# Patient Record
Sex: MALE | Race: BLACK OR AFRICAN AMERICAN
[De-identification: names, ages, dates, MRNs, and addresses within clinical notes are randomized per-mention and may not be internally consistent; named-entity substitution may affect disease eponyms.]

---

## 2022-01-18 PROBLEM — Z00.00 ENCOUNTER FOR PREVENTIVE HEALTH EXAMINATION: Status: ACTIVE | Noted: 2022-01-18

## 2022-01-26 ENCOUNTER — APPOINTMENT (OUTPATIENT)
Dept: PEDIATRIC ALLERGY IMMUNOLOGY | Facility: CLINIC | Age: 32
End: 2022-01-26
Payer: MEDICAID

## 2022-01-26 VITALS
SYSTOLIC BLOOD PRESSURE: 162 MMHG | DIASTOLIC BLOOD PRESSURE: 100 MMHG | BODY MASS INDEX: 28.73 KG/M2 | WEIGHT: 189.56 LBS | HEIGHT: 68 IN | TEMPERATURE: 97.5 F

## 2022-01-26 PROCEDURE — 99203 OFFICE O/P NEW LOW 30 MIN: CPT

## 2022-01-26 NOTE — REASON FOR VISIT
[Initial Evaluation] : an initial evaluation of [Allergy Evaluation/ Skin Testing] : allergy evaluation and or skin testing [Family Member] : family member

## 2022-01-26 NOTE — SOCIAL HISTORY
[House] : [unfilled] lives in a house  [Central Forced Air] : heating provided by central forced air [Window Units] : air conditioning provided by window units [None] : none [Single] : single [Humidifier] : does not use a humidifier [Dehumidifier] : does not use a dehumidifier [Cockroaches] : Patient states that there are no cockroaches in the home [Dust Mite Covers] : does not have dust mite covers [Feather Pillows] : does not have feather pillows [Feather Comforter] : does not have a feather comforter [Bedroom] : not in the bedroom [Basement] : not in the basement [Living Area] : not in the living area [Smokers in Household] : there are no smokers in the home [de-identified] : on stairs

## 2022-01-26 NOTE — HISTORY OF PRESENT ILLNESS
[de-identified] : MEGGAN MARTINS is a 31 year male  with a history of sneezing, runny nose, nasal congestion,  coughing watery and itchy eyes that has been going on for over 10 years. Pt states he recently moved to [place] about 2  months ago, he has not noticed an increased of symptoms. He had an Allergy skin test in his country 2 years ago with only positives to dust mites. Pt states he used to take Zyrtec but through out the years it stop working, he also uses a nasal sprays with some relief. Pt has no other medical conditions and no food allergies. Pt is here to see if he has any new environmental allergies and would like to get proper treatment.

## 2022-01-26 NOTE — ASSESSMENT
[FreeTextEntry1] : 1. AR/AC - Fluticasone nasal, Cetirizine 10g, Azelastine nasal, Montelukast 10mg, Ige and Immunocap.\par \par 2. High blood pressure - advised to re eval with PCP

## 2022-01-26 NOTE — REVIEW OF SYSTEMS
[Eye Discharge] : eye discharge [Eye Redness] : redness [Eye Itching] : itchy eyes [Rhinorrhea] : rhinorrhea [Nasal Congestion] : nasal congestion [Sneezing] : sneezing [Cough] : cough [Nl] : Genitourinary [FreeTextEntry4] : sinus pressure headaches

## 2022-01-27 ENCOUNTER — LABORATORY RESULT (OUTPATIENT)
Age: 32
End: 2022-01-27

## 2022-02-04 LAB
A ALTERNATA IGE QN: <0.1 KUA/L
A FUMIGATUS IGE QN: <0.1 KUA/L
BOXELDER IGE QN: <0.1 KUA/L
C ALBICANS IGE QN: <0.1 KUA/L
CAT DANDER IGE QN: <0.1 KUA/L
CMN PIGWEED IGE QN: <0.1 KUA/L
COMMON RAGWEED IGE QN: <0.1 KUA/L
D FARINAE IGE QN: >100 KUA/L
D PTERONYSS IGE QN: >100 KUA/L
DEPRECATED A ALTERNATA IGE RAST QL: 0
DEPRECATED A FUMIGATUS IGE RAST QL: 0
DEPRECATED BOXELDER IGE RAST QL: 0
DEPRECATED C ALBICANS IGE RAST QL: 0
DEPRECATED CAT DANDER IGE RAST QL: 0
DEPRECATED COMMON PIGWEED IGE RAST QL: 0
DEPRECATED COMMON RAGWEED IGE RAST QL: 0
DEPRECATED D FARINAE IGE RAST QL: 6
DEPRECATED D PTERONYSS IGE RAST QL: 6
DEPRECATED DOG DANDER IGE RAST QL: 0
DEPRECATED ENGL PLANTAIN IGE RAST QL: 0
DEPRECATED KENT BLUE GRASS IGE RAST QL: 0
DEPRECATED RYE IGE RAST QL: 0
DEPRECATED SILVER BIRCH IGE RAST QL: 0
DEPRECATED TIMOTHY IGE RAST QL: 0
DEPRECATED WHITE OAK IGE RAST QL: 0
DOG DANDER IGE QN: <0.1 KUA/L
ENGL PLANTAIN IGE QN: <0.1 KUA/L
KENT BLUE GRASS IGE QN: <0.1 KUA/L
RYE IGE QN: <0.1 KUA/L
SILVER BIRCH IGE QN: <0.1 KUA/L
TIMOTHY IGE QN: <0.1 KUA/L
TOTAL IGE SMQN RAST: 574 KU/L
WHITE OAK IGE QN: <0.1 KUA/L

## 2022-02-09 ENCOUNTER — TRANSCRIPTION ENCOUNTER (OUTPATIENT)
Age: 32
End: 2022-02-09

## 2022-02-09 ENCOUNTER — APPOINTMENT (OUTPATIENT)
Dept: PEDIATRIC ALLERGY IMMUNOLOGY | Facility: CLINIC | Age: 32
End: 2022-02-09
Payer: MEDICAID

## 2022-02-09 VITALS
SYSTOLIC BLOOD PRESSURE: 160 MMHG | HEIGHT: 68 IN | BODY MASS INDEX: 27.74 KG/M2 | DIASTOLIC BLOOD PRESSURE: 100 MMHG | TEMPERATURE: 97.5 F | WEIGHT: 183 LBS

## 2022-02-09 PROCEDURE — 99213 OFFICE O/P EST LOW 20 MIN: CPT

## 2022-02-09 NOTE — HISTORY OF PRESENT ILLNESS
[None] : The patient is currently asymptomatic [de-identified] : MEGGAN MARTINS is a 31 year male with a history of sneezing, runny nose, nasal congestion, coughing watery and itchy eyes that has been going on for over 10 years. Pt states he recently moved to [place] about 2 months ago, he has not noticed an increased of symptoms. He had an Allergy skin test in his country 2 years ago with only positives to dust mites. Pt states he used to take Zyrtec but through out the years it stop working, he also uses a nasal sprays with some relief. Pt has no other medical conditions and no food allergies. \par \par He improved on medicines.  [de-identified] : labs follow up

## 2022-02-09 NOTE — SOCIAL HISTORY
[Mother] : mother [Father] : father [Sister] : sister [House] : [unfilled] lives in a house  [Central Forced Air] : heating provided by central forced air [Window Units] : air conditioning provided by window units [None] : none [Single] : single [Humidifier] : does not use a humidifier [Dehumidifier] : does not use a dehumidifier [Dust Mite Covers] : does not have dust mite covers [Feather Pillows] : does not have feather pillows [Feather Comforter] : does not have a feather comforter [Bedroom] : not in the bedroom [Basement] : not in the basement [Living Area] : not in the living area [Smokers in Household] : there are no smokers in the home

## 2022-05-11 ENCOUNTER — APPOINTMENT (OUTPATIENT)
Dept: PEDIATRIC ALLERGY IMMUNOLOGY | Facility: CLINIC | Age: 32
End: 2022-05-11
Payer: MEDICAID

## 2022-05-11 ENCOUNTER — RX RENEWAL (OUTPATIENT)
Age: 32
End: 2022-05-11

## 2022-05-11 VITALS
BODY MASS INDEX: 27.74 KG/M2 | SYSTOLIC BLOOD PRESSURE: 130 MMHG | HEIGHT: 68 IN | DIASTOLIC BLOOD PRESSURE: 82 MMHG | WEIGHT: 183 LBS

## 2022-05-11 PROCEDURE — 99213 OFFICE O/P EST LOW 20 MIN: CPT

## 2022-05-11 NOTE — HISTORY OF PRESENT ILLNESS
[None] : The patient is currently asymptomatic [de-identified] : MEGGAN MARTINS is a 32 year male  with Allergic Rhinitis. Patient is here for a follow up. He states he has been doing well, his allergy symptoms have been under good control with Cetirizine 10 mg, Montelukast 10 mg, Fluticasone nasal spray and Azelastine nasal spray.

## 2022-05-11 NOTE — ASSESSMENT
[FreeTextEntry1] : 1. AR/AC - Fluticasone nasal, Cetirizine 10mg, Azelastine nasal, Montelukast 10mg\par \par 2. High blood pressure - advised to re eval with PCP

## 2022-11-16 ENCOUNTER — APPOINTMENT (OUTPATIENT)
Dept: PEDIATRIC ALLERGY IMMUNOLOGY | Facility: CLINIC | Age: 32
End: 2022-11-16

## 2022-11-16 VITALS
SYSTOLIC BLOOD PRESSURE: 120 MMHG | HEIGHT: 68 IN | BODY MASS INDEX: 26.98 KG/M2 | TEMPERATURE: 98.1 F | WEIGHT: 178 LBS | DIASTOLIC BLOOD PRESSURE: 80 MMHG

## 2022-11-16 PROCEDURE — 99213 OFFICE O/P EST LOW 20 MIN: CPT

## 2022-11-16 NOTE — PHYSICAL EXAM
[Alert] : alert [Well Nourished] : well nourished [Healthy Appearance] : healthy appearance [No Acute Distress] : no acute distress [Well Developed] : well developed [Normal Pupil & Iris Size/Symmetry] : normal pupil and iris size and symmetry [No Discharge] : no discharge [No Photophobia] : no photophobia [Sclera Not Icteric] : sclera not icteric [Normal TMs] : both tympanic membranes were normal [Normal Nasal Mucosa] : the nasal mucosa was normal [Normal Lips/Tongue] : the lips and tongue were normal [Normal Outer Ear/Nose] : the ears and nose were normal in appearance [Supple] : the neck was supple [Normal Rate and Effort] : normal respiratory rhythm and effort [No Crackles] : no crackles [No Retractions] : no retractions [Bilateral Audible Breath Sounds] : bilateral audible breath sounds [Normal Rate] : heart rate was normal  [Normal S1, S2] : normal S1 and S2 [No murmur] : no murmur [Regular Rhythm] : with a regular rhythm [Skin Intact] : skin intact  [No Rash] : no rash [No Skin Lesions] : no skin lesions [Normal Mood] : mood was normal [Normal Affect] : affect was normal [Alert, Awake, Oriented as Age-Appropriate] : alert, awake, oriented as age appropriate

## 2022-11-16 NOTE — HISTORY OF PRESENT ILLNESS
[None] : The patient is currently asymptomatic [de-identified] : MEGGAN MARTINS is a 32 year male with Allergic Rhinitis. Patient is here for a follow up. He states he has been doing well, his allergy symptoms have been under good control with Cetirizine 10 mg, Montelukast 10 mg, Fluticasone nasal spray and Azelastine nasal spray. \par \peter Colvin is here today for a follow up he said when fall started he started to sneeze more often, headache, congestion, and he still feels something in his throat. so for him it seems to be getting bad for him in the fall. he also sates he needs refill on medications he says the medications help. he is going away to travel to his country December 30 and returning January 29 so he need enough medications until he gets back.

## 2022-11-16 NOTE — REVIEW OF SYSTEMS
[Rhinorrhea] : rhinorrhea [Nasal Congestion] : nasal congestion [Throat Itching] : throat itching [Post Nasal Drip] : post nasal drip [Sneezing] : sneezing [Nl] : Genitourinary

## 2023-05-17 ENCOUNTER — APPOINTMENT (OUTPATIENT)
Dept: PEDIATRIC ALLERGY IMMUNOLOGY | Facility: CLINIC | Age: 33
End: 2023-05-17
Payer: MEDICAID

## 2023-05-17 VITALS
TEMPERATURE: 97.1 F | DIASTOLIC BLOOD PRESSURE: 80 MMHG | BODY MASS INDEX: 26.98 KG/M2 | HEIGHT: 68 IN | WEIGHT: 178 LBS | SYSTOLIC BLOOD PRESSURE: 120 MMHG

## 2023-05-17 PROCEDURE — 99214 OFFICE O/P EST MOD 30 MIN: CPT

## 2023-05-17 NOTE — HISTORY OF PRESENT ILLNESS
[None] : The patient is currently asymptomatic [de-identified] : MEGGAN MARTINS is a 32 year male with Allergic Rhinitis. Patient is here for a follow up. He states he has been doing well, his allergy symptoms have been under good control with Cetirizine 10 mg, Montelukast 10 mg, Fluticasone nasal spray and Azelastine nasal spray. \par \par Meggan is here today for a follow up he said when fall started he started to sneeze more often, headache, congestion, and he still feels something in his throat. so for him it seems to be getting bad for him in the fall. he also sates he needs refill on medications he says the medications help. he is going away to travel to his country December 30 and returning January 29 so he need enough medications until he gets back. \par \par Current Asthma Symptoms: The patient is currently asymptomatic. \par  \par \par He is here today for a follow up about 2 days ago he had coughing he states he thinks it might be because of the weather change \par

## 2023-07-14 NOTE — REVIEW OF SYSTEMS
[Eye Discharge] : eye discharge [Eye Redness] : redness [Eye Itching] : itchy eyes [Rhinorrhea] : rhinorrhea [Nasal Congestion] : nasal congestion [Sneezing] : sneezing [Cough] : cough [Nl] : Genitourinary [FreeTextEntry4] : sinus pressure headaches That inpatient services furnished since the previous certification or recertification were, and continue to be required: for treatment that could reasonably be expected to improve the patient's condition; or, for diagnostic study;    That the hospital records show that the services furnished were: intensive treatment services; admission and related services necessary for diagnostic study or equivalent services;    That the patient continues to need, on a daily basis active inpatient treatment furnished directly by or requiring the supervision of inpatient psychiatric facility personnel. That inpatient services furnished since the previous certification or recertification were, and continue to be required: for treatment that could reasonably be expected to improve the patient's condition; or, for diagnostic study;    That the hospital records show that the services furnished were: intensive treatment services; admission and related services necessary for diagnostic study or equivalent services;    That the patient continues to need, on a daily basis active inpatient treatment furnished directly by or requiring the supervision of inpatient psychiatric facility personnel. That inpatient services furnished since the previous certification or recertification were, and continue to be required: for treatment that could reasonably be expected to improve the patient's condition; or, for diagnostic study;    That the hospital records show that the services furnished were: intensive treatment services; admission and related services necessary for diagnostic study or equivalent services;    That the patient continues to need, on a daily basis active inpatient treatment furnished directly by or requiring the supervision of inpatient psychiatric facility personnel. That inpatient services furnished since the previous certification or recertification were, and continue to be required: for treatment that could reasonably be expected to improve the patient's condition; or, for diagnostic study;    That the hospital records show that the services furnished were: intensive treatment services; admission and related services necessary for diagnostic study or equivalent services;    That the patient continues to need, on a daily basis active inpatient treatment furnished directly by or requiring the supervision of inpatient psychiatric facility personnel. That inpatient services furnished since the previous certification or recertification were, and continue to be required: for treatment that could reasonably be expected to improve the patient's condition; or, for diagnostic study;    That the hospital records show that the services furnished were: intensive treatment services; admission and related services necessary for diagnostic study or equivalent services;    That the patient continues to need, on a daily basis active inpatient treatment furnished directly by or requiring the supervision of inpatient psychiatric facility personnel. That inpatient services furnished since the previous certification or recertification were, and continue to be required: for treatment that could reasonably be expected to improve the patient's condition; or, for diagnostic study;    That the hospital records show that the services furnished were: intensive treatment services; admission and related services necessary for diagnostic study or equivalent services;    That the patient continues to need, on a daily basis active inpatient treatment furnished directly by or requiring the supervision of inpatient psychiatric facility personnel. That inpatient services furnished since the previous certification or recertification were, and continue to be required: for treatment that could reasonably be expected to improve the patient's condition; or, for diagnostic study;    That the hospital records show that the services furnished were: intensive treatment services; admission and related services necessary for diagnostic study or equivalent services;    That the patient continues to need, on a daily basis active inpatient treatment furnished directly by or requiring the supervision of inpatient psychiatric facility personnel. That inpatient services furnished since the previous certification or recertification were, and continue to be required: for treatment that could reasonably be expected to improve the patient's condition; or, for diagnostic study;    That the hospital records show that the services furnished were: intensive treatment services; admission and related services necessary for diagnostic study or equivalent services;    That the patient continues to need, on a daily basis active inpatient treatment furnished directly by or requiring the supervision of inpatient psychiatric facility personnel. That inpatient services furnished since the previous certification or recertification were, and continue to be required: for treatment that could reasonably be expected to improve the patient's condition; or, for diagnostic study;    That the hospital records show that the services furnished were: intensive treatment services; admission and related services necessary for diagnostic study or equivalent services;    That the patient continues to need, on a daily basis active inpatient treatment furnished directly by or requiring the supervision of inpatient psychiatric facility personnel.

## 2023-10-27 ENCOUNTER — RX RENEWAL (OUTPATIENT)
Age: 33
End: 2023-10-27

## 2023-11-15 ENCOUNTER — APPOINTMENT (OUTPATIENT)
Dept: PEDIATRIC ALLERGY IMMUNOLOGY | Facility: CLINIC | Age: 33
End: 2023-11-15
Payer: MEDICAID

## 2023-11-15 VITALS
SYSTOLIC BLOOD PRESSURE: 120 MMHG | DIASTOLIC BLOOD PRESSURE: 80 MMHG | HEIGHT: 68 IN | WEIGHT: 178 LBS | BODY MASS INDEX: 26.98 KG/M2

## 2023-11-15 DIAGNOSIS — J30.1 ALLERGIC RHINITIS DUE TO POLLEN: ICD-10-CM

## 2023-11-15 DIAGNOSIS — H10.13 ACUTE ATOPIC CONJUNCTIVITIS, BILATERAL: ICD-10-CM

## 2023-11-15 PROCEDURE — 99213 OFFICE O/P EST LOW 20 MIN: CPT

## 2023-11-15 RX ORDER — AZELASTINE HYDROCHLORIDE 137 UG/1
0.1 SPRAY, METERED NASAL
Qty: 30 | Refills: 5 | Status: ACTIVE | COMMUNITY
Start: 2022-01-26 | End: 1900-01-01

## 2023-11-15 RX ORDER — CETIRIZINE HYDROCHLORIDE 10 MG/1
10 TABLET, COATED ORAL DAILY
Qty: 30 | Refills: 5 | Status: ACTIVE | COMMUNITY
Start: 2022-01-26 | End: 1900-01-01

## 2023-11-15 RX ORDER — FLUTICASONE PROPIONATE 50 UG/1
50 SPRAY, METERED NASAL
Qty: 16 | Refills: 5 | Status: ACTIVE | COMMUNITY
Start: 2022-01-26 | End: 1900-01-01

## 2024-05-03 ENCOUNTER — RX RENEWAL (OUTPATIENT)
Age: 34
End: 2024-05-03

## 2024-05-03 RX ORDER — MONTELUKAST 10 MG/1
10 TABLET, FILM COATED ORAL
Qty: 30 | Refills: 0 | Status: ACTIVE | COMMUNITY
Start: 2022-01-26 | End: 1900-01-01

## 2024-05-15 ENCOUNTER — APPOINTMENT (OUTPATIENT)
Dept: PEDIATRIC ALLERGY IMMUNOLOGY | Facility: CLINIC | Age: 34
End: 2024-05-15